# Patient Record
Sex: MALE | Race: WHITE | Employment: OTHER | URBAN - METROPOLITAN AREA
[De-identification: names, ages, dates, MRNs, and addresses within clinical notes are randomized per-mention and may not be internally consistent; named-entity substitution may affect disease eponyms.]

---

## 2017-01-25 ENCOUNTER — HOSPITAL ENCOUNTER (OUTPATIENT)
Dept: GENERAL RADIOLOGY | Age: 72
Discharge: HOME | End: 2017-01-25
Payer: COMMERCIAL

## 2017-01-25 DIAGNOSIS — M43.16 SPONDYLOLISTHESIS, LUMBAR REGION: ICD-10-CM

## 2017-01-25 PROCEDURE — 72100 X-RAY EXAM L-S SPINE 2/3 VWS: CPT

## 2017-04-25 ENCOUNTER — PATIENT OUTREACH (OUTPATIENT)
Dept: CASE MANAGEMENT | Age: 72
End: 2017-04-25

## 2017-04-25 ENCOUNTER — HOSPITAL ENCOUNTER (OUTPATIENT)
Dept: PHYSICAL THERAPY | Age: 72
Discharge: HOME | End: 2017-04-25
Payer: COMMERCIAL

## 2017-04-25 PROBLEM — R29.3 POSTURAL IMBALANCE: Status: ACTIVE | Noted: 2017-04-25

## 2017-04-25 PROBLEM — G20 PARKINSON DISEASE (HCC): Status: ACTIVE | Noted: 2017-04-25

## 2017-04-25 PROCEDURE — 92524 BEHAVRAL QUALIT ANALYS VOICE: CPT | Performed by: SPEECH-LANGUAGE PATHOLOGIST

## 2017-04-25 PROCEDURE — G9172 VOICE GOAL STATUS: HCPCS | Performed by: SPEECH-LANGUAGE PATHOLOGIST

## 2017-04-25 PROCEDURE — G9171 VOICE CURRENT STATUS: HCPCS | Performed by: SPEECH-LANGUAGE PATHOLOGIST

## 2017-04-25 NOTE — PROGRESS NOTES
Steve Mihaela  : 1945 [facility] [place] [address], [address], [facility] [address].  [phone]   [phone]       Patient is currently getting PT at [facility] for his low back but also for balance. [facility] Knox are doing it all since they know I have Parkinson's. \" ed to patient on PWR program and encouraged patient to finish current PT to improve balance/function but to eventually come to me at a later date (sooner rather than later) for ed on Parkinson's PWR HEP to prevent further balance/function deficits. Gave patient my card as well.  Patient agreeable

## 2017-04-25 NOTE — PROGRESS NOTES
SIMA NEELY saw patient and spouse in office following assessment in [facility] - patient is currently receiving o/p PT services through [facility] and will continue to do so until completed - he will be referred to ENT for LSVT clearance - patient was provided with community resource information on Parkinsons diease as well as contact information for this SW and also for ST in the future - was reminded of two week f/u phone call.

## 2017-05-01 ENCOUNTER — HOSPITAL ENCOUNTER (OUTPATIENT)
Dept: GENERAL RADIOLOGY | Age: 72
Discharge: HOME | End: 2017-05-01
Payer: COMMERCIAL

## 2017-05-01 DIAGNOSIS — M43.16 SPONDYLOLISTHESIS, LUMBAR REGION: ICD-10-CM

## 2017-05-01 PROCEDURE — 72100 X-RAY EXAM L-S SPINE 2/3 VWS: CPT

## 2017-08-14 NOTE — PROGRESS NOTES
Ran Galvan  : 1945 [facility] [place] [address] [facility], [address] [place] [address],[address], [facility] [address].  [phone]   [phone]         OUTPATIENT SPEECH LANGUAGE PATHOLOGY: Discontinuation Summary    ICD-10: Treatment Diagnosis: Dysphonia R49.0  REFERRING PHYSICIAN: Wil Sherman,*  MD Orders: Evaluate and Treat  Return Physician Appointment: [facility]  PAST MEDICAL HISTORY:   Mr. Eduar Griffith is a 70 y.o. male who  has a past medical history of Arthritis; Calculus of kidney; Chronic pain; Claustrophobia; Hypertension; Lumbar stenosis with neurogenic claudication (11/10/2016); Parkinson's disease ([facility] [address] and Spondylolisthesis at L5-S1 level (10/25/2016). He also  has a past surgical history that includes lithotripsy and neurological procedure unlisted. MEDICAL/REFERRING DIAGNOSIS: Parkinson's disease [G20]  DATE OF ONSET: aproximately around   PRIOR LEVEL OF FUNCTION: Retired. 22 1/2 years as a  and also worked about 22 years at [address] [place] [address] [employer]: [facility] however seasonal allergies  ASSESSMENT:    SLP called and asked patient if he wanted to participate in LSVT on 8/10/17. Patient returned phone call and stated that he did not wish to participate. No goals were assessed at this time. Patient will need new order if he chooses to participate in LSVT at a later time. ?????? ? ? This section established at most recent assessment??????????  PROBLEM LIST (Impairments causing functional limitations):  1. dysphonia  GOALS: (Goals have been discussed and agreed upon with patient.)  SHORT-TERM FUNCTIONAL GOALS: Time Frame: 3-6 months  1. The pt will increase vocal loudness to an average of 70 dB at the word level.   Not assessed  2. The pt will increased vocal loudness to an average of 70 dB at the sentence level.  Not assessed  3. The pt will increased vocal loudness to an average of 70 dB while reading. Not assesed  4.  The pt will increase vocal loudness to an average of 70 dB during spontaneous speech. Not assessed  DISCHARGE GOALS: Time Frame: 3-6 months    1. Increased communication evidenced by an average of 70 dB in spontaneous speech. Not assessed  REHABILITATION POTENTIAL FOR STATED GOALS: GoodP  SUBJECTIVE:  Cooperative  Present Symptoms: Dysphonia      Current Medications:   Current Outpatient Prescriptions on File Prior to Encounter   Medication Sig Dispense Refill    losartan (COZAAR) 50 mg tablet Take  by mouth daily.  carbidopa-levodopa CR (SINEMET CR)  mg per tablet 1 tab Qhs 90 Tab 3    docusate sodium (COLACE) 100 mg capsule Take 100 mg by mouth every morning. Indications: CONSTIPATION      VIT C/VIT E/LUTEIN/MIN/OMEGA-3 (OCUVITE PO) Take  by mouth every morning. Last dose 10/19/16      cyanocobalamin (VITAMIN B-12) 1,000 mcg sublingual tablet Take 1,000 mcg by mouth every morning. Last dose 10/19/16      selegeline (ELDEPRYL) 5 mg tablet Take 5 mg by mouth two (2) times a day.  fluticasone (FLONASE) 50 mcg/actuation nasal spray 2 Sprays by Both Nostrils route daily.  multivitamin (ONE A DAY) tablet Take 1 Tab by mouth every morning. Last dose 10/19/16      Omega-3 Fatty Acids 500 mg cpDR Take  by mouth every morning. Last dose 10/19/16      ascorbic acid, vitamin C, (VITAMIN C) 500 mg tablet Take  by mouth. No current facility-administered medications on file prior to encounter. Date Last Reviewed: 4/25/17  History of reflux:  NO      Reflux medication:N/A  Social History/Home Situation: Lives with wife. Work/Activity History: Retired     OBJECTIVE:  Objective Measure: Tool Used: National Outcomes Measurement System: Functional Communication Measures: VOICE  Score:  Initial: 5 Most Recent: X (Date: -- )   Interpretation of Tool: This FCM should not be used for individuals who have had a laryngectomy or tracheotomy, or for individuals with resonance disorders.   o Level 1:  The individual is unable to use voice to communicate. Alternative means for communicating are used all of the time. The individual cannot participate in vocational, avocational, and social activities requiring voice. o Level 2:  Voice is not functional for communication most of the time. Alternative means for communicating must be used most of the time. The individuals participation in vocational, avocational, and social activities is significantly limited all of the time. o Level 3:  Voice is functional for communication, but is consistently distracting and interferes with communication by drawing attention to itself. Participation in vocational, avocational, and social activities is limited most of the time. o Level 4:  Voice is functional for communication, but sometimes distracting. The individuals ability to participate in vocational, avocational, and social activities requiring voice is occasionally affected in low-vocal demand activities, but consistently affected in high-vocal demand activities. o Level 5:  Voice occasionally sounds normal with self-monitoring, but there is some situational variation. The individuals ability to participate in vocational, avocational, and social activities requiring voice is rarely affected in low-vocal demand activities, but is occasionally affected in high-vocal demand activities. o Level 6:  Voice sounds normal most of the time across all settings and situations. Self-monitoring is consistent when needed. The individuals ability to participate in vocational, avocational, and social activities requiring voice is not affected in low vocal demand activities, but is rarely affected in high-vocal demand activities. o Level 7: The individuals ability to successfully and independently participate in vocational, avocational, and social activities requiring high-or low-vocal demands is not limited by voice.  Self-monitoring is effectively used, but only occasionally needed. Score Level 7 Level 6 Level 5 Level 4 Level 3 Level 2 Level 1   Modifier CH CI CJ CK CL CM CN   ? Voice:     - CURRENT STATUS: CJ - 20%-39% impaired, limited or restricted    - GOAL STATUS:  CI - 1%-19% impaired, limited or restricted    - D/C STATUS:  ---------------To be determined---------------  not lali to assess    Oral Motor Structure/Speech:     Voice: Voice Evaluation  Vocal Onset: No impairment  Vocal Quality: Low volume  Vocal Intensity: Monotone  Vocal Pitch: Impaired flexibility, Restricted range  Resonance: No impairment  Breath Support: Clavicular breathing  Vocal Cord Dysfunction: No impairment  Vocal Cord Dysfunction: No  Overall Voice Impairment Severity: Mild-moderate  S/Z Ratio: 3/8=.37  Maximum Phonation Time (with sustained \"ah\"): · Baseline volume: 1. 9 seconds at 61 dB, 2. 11 seconds at 63 dB  · Loud volume:  9 seconds at 72 dB, 2.10 seconds at 73 dB  Conversational Speech: 10  01 11 69 47 31 57 65 55 61 60 62 59 52 57 54 57 66 74 66 66   Oral Reading of the Grandfather Passage: 62  66 62 64 64 56 68 65 63 51 60 61 66 62 66 61 67 60 66 67 60   GRBAS:   Grade (overall degree of hoarseness): 1  Rough (impression of the irregularity of the vocal fold vibrations): 1  Breathy (impression of air loss through the glottis):0  Aesthenic (weakness or lack of power in the voice): 1  Strained (perceptual impression of vocal hyperfunction): 0      TOTAL SCORE (Normal=0): 3  Glides: impaired in pitch and range  Scales: impaired in pitch and range  Vocally Abusive Behavior:   · Caffeinated beverages    Assessment/Reassessment only, no treatment provided today__________________________________________________________________________________________________  Treatment Assessment:  Voice evaluation. Progression/Medical Necessity:   Patient is expected to demonstrate progress in volume to improve functional communication. Compliance with Program/Exercises:  Will assess as treatment progresses. Reason for Continuation of Services/Other Comments:  · Patient continues to require skilled intervention due to Dysphonia. Recommendations/Intent for next treatment session: \"Treatment next visit will focus on LSVT tasks\". NATHALIE Cristobal

## 2017-08-22 ENCOUNTER — APPOINTMENT (RX ONLY)
Dept: URBAN - METROPOLITAN AREA CLINIC 24 | Facility: CLINIC | Age: 72
Setting detail: DERMATOLOGY
End: 2017-08-22

## 2017-08-22 DIAGNOSIS — L82.1 OTHER SEBORRHEIC KERATOSIS: ICD-10-CM

## 2017-08-22 DIAGNOSIS — L57.0 ACTINIC KERATOSIS: ICD-10-CM

## 2017-08-22 DIAGNOSIS — L57.8 OTHER SKIN CHANGES DUE TO CHRONIC EXPOSURE TO NONIONIZING RADIATION: ICD-10-CM

## 2017-08-22 PROBLEM — M12.9 ARTHROPATHY, UNSPECIFIED: Status: ACTIVE | Noted: 2017-08-22

## 2017-08-22 PROCEDURE — 99203 OFFICE O/P NEW LOW 30 MIN: CPT

## 2017-08-22 PROCEDURE — ? PATIENT SPECIFIC COUNSELING

## 2017-08-22 PROCEDURE — ? COUNSELING

## 2017-08-22 PROCEDURE — ? PRESCRIPTION

## 2017-08-22 RX ORDER — FLUOROURACIL 2 G/40G
CREAM TOPICAL BID
Qty: 1 | Refills: 0 | Status: ERX | COMMUNITY
Start: 2017-08-22

## 2017-08-22 RX ADMIN — FLUOROURACIL: 2 CREAM TOPICAL at 17:05

## 2017-08-22 ASSESSMENT — LOCATION DETAILED DESCRIPTION DERM
LOCATION DETAILED: SUPERIOR MID FOREHEAD
LOCATION DETAILED: INFERIOR THORACIC SPINE

## 2017-08-22 ASSESSMENT — LOCATION ZONE DERM
LOCATION ZONE: FACE
LOCATION ZONE: TRUNK

## 2017-08-22 ASSESSMENT — LOCATION SIMPLE DESCRIPTION DERM
LOCATION SIMPLE: UPPER BACK
LOCATION SIMPLE: SUPERIOR FOREHEAD

## 2017-09-05 PROBLEM — R49.8 HYPOPHONIA: Status: ACTIVE | Noted: 2017-09-05

## 2017-09-05 PROBLEM — R25.1 TREMOR: Status: ACTIVE | Noted: 2017-09-05

## 2017-11-01 ENCOUNTER — HOSPITAL ENCOUNTER (OUTPATIENT)
Dept: GENERAL RADIOLOGY | Age: 72
Discharge: HOME | End: 2017-11-01
Payer: COMMERCIAL

## 2017-11-01 DIAGNOSIS — M43.17 SPONDYLOLISTHESIS AT L5-S1 LEVEL: ICD-10-CM

## 2017-11-01 PROCEDURE — 72100 X-RAY EXAM L-S SPINE 2/3 VWS: CPT

## 2018-02-27 ENCOUNTER — APPOINTMENT (RX ONLY)
Dept: URBAN - METROPOLITAN AREA CLINIC 24 | Facility: CLINIC | Age: 73
Setting detail: DERMATOLOGY
End: 2018-02-27

## 2018-02-27 DIAGNOSIS — L82.0 INFLAMED SEBORRHEIC KERATOSIS: ICD-10-CM

## 2018-02-27 DIAGNOSIS — L57.0 ACTINIC KERATOSIS: ICD-10-CM

## 2018-02-27 DIAGNOSIS — L82.1 OTHER SEBORRHEIC KERATOSIS: ICD-10-CM

## 2018-02-27 PROBLEM — J30.1 ALLERGIC RHINITIS DUE TO POLLEN: Status: ACTIVE | Noted: 2018-02-27

## 2018-02-27 PROBLEM — H91.90 UNSPECIFIED HEARING LOSS, UNSPECIFIED EAR: Status: ACTIVE | Noted: 2018-02-27

## 2018-02-27 PROCEDURE — 99213 OFFICE O/P EST LOW 20 MIN: CPT | Mod: 25

## 2018-02-27 PROCEDURE — ? LIQUID NITROGEN

## 2018-02-27 PROCEDURE — ? OTHER

## 2018-02-27 PROCEDURE — ? COUNSELING

## 2018-02-27 PROCEDURE — 17110 DESTRUCTION B9 LES UP TO 14: CPT

## 2018-02-27 ASSESSMENT — LOCATION DETAILED DESCRIPTION DERM
LOCATION DETAILED: RIGHT CENTRAL ZYGOMA
LOCATION DETAILED: RIGHT MID-UPPER BACK
LOCATION DETAILED: LEFT INFERIOR UPPER BACK

## 2018-02-27 ASSESSMENT — LOCATION ZONE DERM
LOCATION ZONE: FACE
LOCATION ZONE: TRUNK

## 2018-02-27 ASSESSMENT — LOCATION SIMPLE DESCRIPTION DERM
LOCATION SIMPLE: RIGHT UPPER BACK
LOCATION SIMPLE: RIGHT ZYGOMA
LOCATION SIMPLE: LEFT UPPER BACK

## 2018-02-27 NOTE — HPI: MEDICATION (5-FLUOROURACIL)
How Severe Are The Lesions?: moderate
Is This A New Presentation, Or A Follow-Up?: Follow Up 5-Fluorouracil Therapy
How Many Weeks Of 5-Fu Have You Completed?: 3

## 2018-02-27 NOTE — PROCEDURE: OTHER
Note Text (......Xxx Chief Complaint.): This diagnosis correlates with the
Detail Level: Zone
Other (Free Text): Patient complete a full course of Efudex on forehead and face.He had a great outcome.

## 2018-02-27 NOTE — PROCEDURE: LIQUID NITROGEN
Medical Necessity Information: It is in your best interest to select a reason for this procedure from the list below. All of these items fulfill various CMS LCD requirements except the new and changing color options.
Add 52 Modifier (Optional): no
Post-Care Instructions: I reviewed with the patient in detail post-care instructions. Patient is to wear sunprotection, and avoid picking at any of the treated lesions. Pt may apply Vaseline to crusted or scabbing areas.
Consent: The patient's consent was obtained including but not limited to risks of crusting, scabbing, blistering, scarring, darker or lighter pigmentary change, recurrence, incomplete removal and infection.
Detail Level: Detailed
Medical Necessity Clause: This procedure was medically necessary because the lesions that were treated were:

## 2018-05-07 PROBLEM — G24.8 LIMB DYSTONIA: Status: ACTIVE | Noted: 2018-05-07

## 2018-08-20 PROBLEM — G24.9 DYSKINESIA DUE TO PARKINSON'S DISEASE (HCC): Status: ACTIVE | Noted: 2018-08-20

## 2018-08-20 PROBLEM — G20 DYSKINESIA DUE TO PARKINSON'S DISEASE (HCC): Status: ACTIVE | Noted: 2018-08-20

## 2018-08-20 PROBLEM — R26.89 MULTIFACTORIAL GAIT DISORDER: Status: ACTIVE | Noted: 2018-08-20

## 2019-02-22 NOTE — PROGRESS NOTES
Ran Galvan  : 1945 [facility]  [address], [address], [facility].  [phone]   [phone]         OUTPATIENT SPEECH LANGUAGE PATHOLOGY: Initial Assessment    ICD-10: Treatment Diagnosis: Dysphonia R49.0  REFERRING PHYSICIAN: Wil Sherman,*  MD Orders: Evaluate and Treat  Return Physician Appointment: [facility]  PAST MEDICAL HISTORY:   Mr. Eduar Griffith is a 70 y.o. male who  has a past medical history of Arthritis; Calculus of kidney; Chronic pain; Claustrophobia; Hypertension; Lumbar stenosis with neurogenic claudication (11/10/2016); Parkinson's disease ([facility] [address] and Spondylolisthesis at L5-S1 level (10/25/2016). He also  has a past surgical history that includes lithotripsy and neurological procedure unlisted. MEDICAL/REFERRING DIAGNOSIS: Parkinson's disease [G20]  DATE OF ONSET: aproximately around   PRIOR LEVEL OF FUNCTION: Retired. 22 1/2 years as a  and also worked about 22 years at [address] [place] [address] [employer]: [facility] however seasonal allergies  ASSESSMENT:  Patient is present this date at the [place] [address]. Patient was approximately diagnosed with PD around . He first noticed changes in his walking and speech. His speech was slurred and his wife thought that he had a stroke. She took him to the hospital and they ran tests and all came back normal. He was sent to Dr. Torrey Jarvis who diagnosed him with PD. He denies deficits in swallowing. However he reports changes in his voice. He states that he has to force himself to be loud. Denies reflux. Drinks about 3-4 8 ounces of water and drinks moderate amounts of coffee. Based on the objective data described below, Mr. Eduar Griffith presents with a mild-moderate voice disorder characterized by mild hoarseness and breathiness, mild-moderate vocal range and  decreased prosody and intonation.   In conversational speech, the pt's dB ranged from 52-64 with an average of 60. Volume increased to an average of 62 with oral reading of \"The Grandfather Passage\". When asked to produce sustained \"ah\", maximum duration was 9 seconds at 61 dB. When asked to produce a loud \"ah\" duration unchanged to 9 seconds with an increased in dB to 73. This patient is a candidate for LSVT, which is an intense voice therapy designed for patients with Parkinson's disease. Patient will need an ENT consult to assess vocal cord function prior to beginning therapy. At this time, patient will complete the ENT referral but wish to not begin treatment until Summer. He is currently getting PT and wish to finish that before he starts. He will attend therapy at [facility].   ?????? ? ? This section established at most recent assessment??????????  PROBLEM LIST (Impairments causing functional limitations):  1. dysphonia  GOALS: (Goals have been discussed and agreed upon with patient.)  SHORT-TERM FUNCTIONAL GOALS: Time Frame: 3-6 months  1. The pt will increase vocal loudness to an average of 70 dB at the word level.     2. The pt will increased vocal loudness to an average of 70 dB at the sentence level.    3. The pt will increased vocal loudness to an average of 70 dB while reading. 4. The pt will increase vocal loudness to an average of 70 dB during spontaneous speech. DISCHARGE GOALS: Time Frame: 3-6 months    1. Increased communication evidenced by an average of 70 dB in spontaneous speech. REHABILITATION POTENTIAL FOR STATED GOALS: GoodPLAN OF CARE:  Patient will benefit from skilled intervention to address the following impairments.   INTERVENTIONS PLANNED: (Benefits and precautions of therapy have been discussed with the patient.)  COMMUNICATION STRATEGIES: \"Loud\" speech  VOCAL HYGIENE RECOMMENDATIONS:  · Increase water intake  · Limit caffeinated beverages  TREATMENT PLAN EFFECTIVE DATES: 4/25/17 TO 7/25/17  FREQUENCY/DURATION: Continue to follow patient 4 times a week for 4 weeks to address above goals. Regarding Abrahan Kramer's therapy, I certify that the treatment plan above will be carried out by a therapist or under their direction. Thank you for this referral,  NATHALIE Webb      _______________________________________               _______________   Referring Physician Signature: Hallie Jannette Blanchards,*     Date      SUBJECTIVE:  Cooperative  Present Symptoms: Dysphonia      Current Medications:   Current Outpatient Prescriptions on File Prior to Encounter   Medication Sig Dispense Refill    losartan (COZAAR) 50 mg tablet Take  by mouth daily.  carbidopa-levodopa (SINEMET)  mg per tablet 1.5 tab QID (every 5 hrs) 540 Tab 3    carbidopa-levodopa CR (SINEMET CR)  mg per tablet 1 tab Qhs 90 Tab 3    VIT C/VIT E/LUTEIN/MIN/OMEGA-3 (OCUVITE PO) Take  by mouth.  docusate sodium (COLACE) 100 mg capsule Take 100 mg by mouth every morning. Indications: CONSTIPATION      VIT C/VIT E/LUTEIN/MIN/OMEGA-3 (OCUVITE PO) Take  by mouth every morning. Last dose 10/19/16      cyanocobalamin (VITAMIN B-12) 1,000 mcg sublingual tablet Take 1,000 mcg by mouth every morning. Last dose 10/19/16      selegeline (ELDEPRYL) 5 mg tablet Take 5 mg by mouth two (2) times a day.  fluticasone (FLONASE) 50 mcg/actuation nasal spray 2 Sprays by Both Nostrils route daily.  multivitamin (ONE A DAY) tablet Take 1 Tab by mouth every morning. Last dose 10/19/16      Omega-3 Fatty Acids 500 mg cpDR Take  by mouth every morning. Last dose 10/19/16      ascorbic acid, vitamin C, (VITAMIN C) 500 mg tablet Take  by mouth. No current facility-administered medications on file prior to encounter. Date Last Reviewed: 4/25/17  History of reflux:  NO      Reflux medication:N/A  Social History/Home Situation: Lives with wife. Work/Activity History: Retired     OBJECTIVE:  Objective Measure:   Tool Used: National Outcomes Measurement System: Functional Communication Measures: VOICE  Score:  Initial: 5 Most Recent: X (Date: -- )   Interpretation of Tool: This FCM should not be used for individuals who have had a laryngectomy or tracheotomy, or for individuals with resonance disorders. o Level 1:  The individual is unable to use voice to communicate. Alternative means for communicating are used all of the time. The individual cannot participate in vocational, avocational, and social activities requiring voice. o Level 2:  Voice is not functional for communication most of the time. Alternative means for communicating must be used most of the time. The individuals participation in vocational, avocational, and social activities is significantly limited all of the time. o Level 3:  Voice is functional for communication, but is consistently distracting and interferes with communication by drawing attention to itself. Participation in vocational, avocational, and social activities is limited most of the time. o Level 4:  Voice is functional for communication, but sometimes distracting. The individuals ability to participate in vocational, avocational, and social activities requiring voice is occasionally affected in low-vocal demand activities, but consistently affected in high-vocal demand activities. o Level 5:  Voice occasionally sounds normal with self-monitoring, but there is some situational variation. The individuals ability to participate in vocational, avocational, and social activities requiring voice is rarely affected in low-vocal demand activities, but is occasionally affected in high-vocal demand activities. o Level 6:  Voice sounds normal most of the time across all settings and situations. Self-monitoring is consistent when needed. The individuals ability to participate in vocational, avocational, and social activities requiring voice is not affected in low vocal demand activities, but is rarely affected in high-vocal demand activities. o Level 7:   The individuals ability to successfully and independently participate in vocational, avocational, and social activities requiring high-or low-vocal demands is not limited by voice. Self-monitoring is effectively used, but only occasionally needed. Score Level 7 Level 6 Level 5 Level 4 Level 3 Level 2 Level 1   Modifier CH CI CJ CK CL CM CN   ? Voice:     - CURRENT STATUS: CJ - 20%-39% impaired, limited or restricted    - GOAL STATUS:  CI - 1%-19% impaired, limited or restricted    - D/C STATUS:  ---------------To be determined---------------    Oral Motor Structure/Speech:     Voice: Voice Evaluation  Vocal Onset: No impairment  Vocal Quality: Low volume  Vocal Intensity: Monotone  Vocal Pitch: Impaired flexibility, Restricted range  Resonance: No impairment  Breath Support: Clavicular breathing  Vocal Cord Dysfunction: No impairment  Vocal Cord Dysfunction: No  Overall Voice Impairment Severity: Mild-moderate  S/Z Ratio: 3/8=.37  Maximum Phonation Time (with sustained \"ah\"):    · Baseline volume: 1. 9 seconds at 61 dB, 2. 11 seconds at 63 dB  · Loud volume:  9 seconds at 72 dB, 2.10 seconds at 73 dB  Conversational Speech: 01  47 97 44 24 51 42 65 55 61 60 62 59 52 57 54 57 66 74 66 66   Oral Reading of the Grandfather Passage: 62  66 62 64 64 56 68 65 63 51 60 61 66 62 66 61 67 60 66 67 60   GRBAS:   Grade (overall degree of hoarseness): 1  Rough (impression of the irregularity of the vocal fold vibrations): 1  Breathy (impression of air loss through the glottis):0  Aesthenic (weakness or lack of power in the voice): 1  Strained (perceptual impression of vocal hyperfunction): 0      TOTAL SCORE (Normal=0): 3  Glides: impaired in pitch and range  Scales: impaired in pitch and range  Vocally Abusive Behavior:   · Caffeinated beverages    Assessment/Reassessment only, no treatment provided today__________________________________________________________________________________________________  Treatment Assessment:  Voice evaluation. Progression/Medical Necessity:   Patient is expected to demonstrate progress in volume to improve functional communication. Compliance with Program/Exercises: Will assess as treatment progresses. Reason for Continuation of Services/Other Comments:  · Patient continues to require skilled intervention due to Dysphonia. Recommendations/Intent for next treatment session: \"Treatment next visit will focus on LSVT tasks\". Total Treatment Duration:  Time In: 1115  Time Out: 80[address] [facility], [facility]. Robby Manriquez Yes

## 2020-02-19 PROBLEM — K59.01 CONSTIPATION BY DELAYED COLONIC TRANSIT: Status: ACTIVE | Noted: 2020-02-19

## 2020-11-17 PROBLEM — K11.7 XEROSTOMIA: Status: ACTIVE | Noted: 2020-11-17

## 2021-10-22 PROBLEM — M25.672 ANKLE JOINT STIFFNESS, BILATERAL: Status: ACTIVE | Noted: 2021-10-22

## 2021-10-22 PROBLEM — G24.8 LIMB DYSTONIA: Status: RESOLVED | Noted: 2018-05-07 | Resolved: 2021-10-22

## 2021-10-22 PROBLEM — M25.671 ANKLE JOINT STIFFNESS, BILATERAL: Status: ACTIVE | Noted: 2021-10-22

## 2022-03-18 PROBLEM — R29.3 POSTURAL IMBALANCE: Status: ACTIVE | Noted: 2017-04-25

## 2022-03-19 PROBLEM — G20.A1 PARKINSON DISEASE: Status: ACTIVE | Noted: 2017-04-25

## 2022-03-19 PROBLEM — G20 PARKINSON DISEASE (HCC): Status: ACTIVE | Noted: 2017-04-25

## 2022-03-19 PROBLEM — R49.8 HYPOPHONIA: Status: ACTIVE | Noted: 2017-09-05

## 2022-03-19 PROBLEM — K11.7 XEROSTOMIA: Status: ACTIVE | Noted: 2020-11-17

## 2022-03-19 PROBLEM — M25.672 ANKLE JOINT STIFFNESS, BILATERAL: Status: ACTIVE | Noted: 2021-10-22

## 2022-03-19 PROBLEM — M25.671 ANKLE JOINT STIFFNESS, BILATERAL: Status: ACTIVE | Noted: 2021-10-22

## 2022-03-20 PROBLEM — G20 DYSKINESIA DUE TO PARKINSON'S DISEASE (HCC): Status: ACTIVE | Noted: 2018-08-20

## 2022-03-20 PROBLEM — G24.9 DYSKINESIA DUE TO PARKINSON'S DISEASE (HCC): Status: ACTIVE | Noted: 2018-08-20

## 2022-03-20 PROBLEM — K59.01 CONSTIPATION BY DELAYED COLONIC TRANSIT: Status: ACTIVE | Noted: 2020-02-19

## 2022-03-20 PROBLEM — R26.89 MULTIFACTORIAL GAIT DISORDER: Status: ACTIVE | Noted: 2018-08-20

## 2022-03-20 PROBLEM — R25.1 TREMOR: Status: ACTIVE | Noted: 2017-09-05

## 2022-03-20 PROBLEM — G20.B1 DYSKINESIA DUE TO PARKINSON'S DISEASE: Status: ACTIVE | Noted: 2018-08-20

## 2022-11-18 ENCOUNTER — TELEPHONE (OUTPATIENT)
Dept: NEUROLOGY | Age: 77
End: 2022-11-18

## 2022-11-18 NOTE — TELEPHONE ENCOUNTER
Patient received samples of an extended release amantadinelast year that was not covered. They are switching insurance plans and they are requesting the name of the medication. They were working with a  and they need the name of the medication to see if it is covered. When he tried the samples, it worked very well.

## 2023-01-12 ENCOUNTER — OFFICE VISIT (OUTPATIENT)
Dept: NEUROLOGY | Age: 78
End: 2023-01-12
Payer: MEDICARE

## 2023-01-12 VITALS
DIASTOLIC BLOOD PRESSURE: 90 MMHG | SYSTOLIC BLOOD PRESSURE: 140 MMHG | BODY MASS INDEX: 27.46 KG/M2 | HEART RATE: 90 BPM | WEIGHT: 165 LBS

## 2023-01-12 DIAGNOSIS — G47.52 RBD (REM BEHAVIORAL DISORDER): ICD-10-CM

## 2023-01-12 DIAGNOSIS — R25.1 TREMOR: ICD-10-CM

## 2023-01-12 DIAGNOSIS — Z79.899 ENCOUNTER FOR LONG-TERM (CURRENT) USE OF HIGH-RISK MEDICATION: ICD-10-CM

## 2023-01-12 DIAGNOSIS — G20 PARKINSON DISEASE (HCC): Primary | ICD-10-CM

## 2023-01-12 DIAGNOSIS — G24.9 DYSKINESIA DUE TO PARKINSON'S DISEASE (HCC): ICD-10-CM

## 2023-01-12 DIAGNOSIS — G20 DYSKINESIA DUE TO PARKINSON'S DISEASE (HCC): ICD-10-CM

## 2023-01-12 DIAGNOSIS — R13.19 DYSPHAGIA, NEUROLOGIC: ICD-10-CM

## 2023-01-12 DIAGNOSIS — R49.8 HYPOPHONIA: ICD-10-CM

## 2023-01-12 PROCEDURE — 1123F ACP DISCUSS/DSCN MKR DOCD: CPT | Performed by: PSYCHIATRY & NEUROLOGY

## 2023-01-12 PROCEDURE — 3077F SYST BP >= 140 MM HG: CPT | Performed by: PSYCHIATRY & NEUROLOGY

## 2023-01-12 PROCEDURE — 3080F DIAST BP >= 90 MM HG: CPT | Performed by: PSYCHIATRY & NEUROLOGY

## 2023-01-12 PROCEDURE — 99215 OFFICE O/P EST HI 40 MIN: CPT | Performed by: PSYCHIATRY & NEUROLOGY

## 2023-01-12 RX ORDER — CLONAZEPAM 0.5 MG/1
TABLET ORAL
Qty: 45 TABLET | Refills: 1 | Status: SHIPPED | OUTPATIENT
Start: 2023-01-12 | End: 2023-04-12

## 2023-01-12 RX ORDER — AMANTADINE 137 MG/1
CAPSULE, COATED PELLETS ORAL
Qty: 180 CAPSULE | Refills: 3
Start: 2023-01-12

## 2023-01-12 NOTE — PROGRESS NOTES
01/12/23  Alo Grigsby        Chief Complaint:  Chief Complaint   Patient presents with    Follow-up     Parkinson's disease       Parkinson's Disease Diagnosed: 2011 with tremor      HPI:   Alo Grigsby, 68 y. o.,male here in clinic follow up for continued management of Parkinson's Disease. He has been doing \"pretty well\" but voice is softer and hoarse. Wife states that no one can hear him on the phone. Swallowing is not an issue. He has thick saliva in the AM. Denies drooling. Sinemet is working well. Denies wearing off between doses. Dyskinesia controlled. Sleeping well. Has been having some dream enactment. Happening about 50% of the time. Wife states that when he was taking Gocovri for a month he was doing much better and even sleeping better. He did not want to do the paperwork for the financial assistance but can't afford $6400 a month so he went back to amantadine. He is still exercising daily. Current Neuro related meds:  Sinemet 25/100mg 2 tabs QID Q 4 hrs apart  Sinemet CR 50/200mg Qhs   Amantadine 100mg BID  Melatonin 5mg 2 caps Qhs  Selegiline 5mg BID      Review of Systems:    Review of Systems     Patient denies:  dizziness or light headedness,  drooling or swallowing issues  constipation,   hallucinations/ visual illusions or impulse control disorder   recent falls.    RBD     RLS    Past Medical History:   Diagnosis Date    Arthritis     spine    Calculus of kidney     Chronic pain     lumbar/ both legs    Claustrophobia     Hypertension     controlled with med    Lumbar stenosis with neurogenic claudication 11/10/2016    Parkinson's disease [facility])     dx age 59    Spondylolisthesis at L5-S1 level 10/25/2016        Past Surgical History:   Procedure Laterality Date    LITHOTRIPSY      age 25    NEUROLOGICAL SURGERY      back surgery       Family History   Problem Relation Age of Onset    No Known Problems Father     No Known Problems Sister     Hypertension Other     Dementia Mother Social History     Socioeconomic History    Marital status:      Spouse name: Not on file    Number of children: Not on file    Years of education: Not on file    Highest education level: Not on file   Occupational History    Not on file   Tobacco Use    Smoking status: Never    Smokeless tobacco: Never   Substance and Sexual Activity    Alcohol use: No    Drug use: No    Sexual activity: Not on file   Other Topics Concern    Not on file   Social History Narrative    Not on file     Social Determinants of Health     Financial Resource Strain: Not on file   Food Insecurity: Not on file   Transportation Needs: Not on file   Physical Activity: Not on file   Stress: Not on file   Social Connections: Not on file   Intimate Partner Violence: Not on file   Housing Stability: Not on file       Current Outpatient Medications on File Prior to Visit   Medication Sig Dispense Refill    Amantadine (SYMMETREL) 100 MG TABS tablet TAKE 1 TABLET BY MOUTH TWICE A DAY      ascorbic acid (VITAMIN C) 500 MG tablet Take by mouth      carbidopa-levodopa (SINEMET)  MG per tablet Take 2 tablets by mouth 4 times daily      carbidopa-levodopa (SINEMET CR)  MG per extended release tablet TAKE 1 TABLET BY MOUTH EVERYDAY AT BEDTIME      Cyanocobalamin 1000 MCG SUBL Take 1,000 mcg by mouth      docusate (COLACE, DULCOLAX) 100 MG CAPS Take 100 mg by mouth      fluticasone (FLONASE) 50 MCG/ACT nasal spray 2 sprays by Nasal route daily      losartan (COZAAR) 50 MG tablet Take by mouth daily      Melatonin 5 MG CAPS Take 10 mg by mouth      selegiline (ELDEPRYL) 5 MG tablet Take 5 mg by mouth 2 times daily       No current facility-administered medications on file prior to visit.        No Known Allergies        Physical Examination    Vitals:    01/12/23 1031 01/12/23 1034   BP: (!) 144/90 (!) 140/90   Position: Sitting Standing   Pulse: 90 90   Weight: 165 lb (74.8 kg)          General: No acute distress  Psychiatric: well oriented, normal mood and affect  Cardiovascular: no peripheral edema, no JVD, no carotid bruits, RRR  Pulmonary: CTAB  Skin: No rashes, lesions or ulcers  Ext: no C/C/E      Neurologic Exam  Patient oriented to Person, Place and Time. Language and fund of knowledge intact. CN:  Extraocular movements are intact,facial sensation  Intact and strength is intact, , palate elevates normally, tongue protrudes midline, shoulder shrug is normal.    Motor:RUE 5/5, RLE 5/5, LUE 5/5, LLE 5/5 ,  normal bulk and tone    Reflexes: Patellar reflexes are +2 , Achilles reflexes are 1+ , toes are downgoing. Sensation: Normal  light touch, temperature and vibration throughout     Cerebellar: FTN, HTS intact    Motor Examination: Last dose of sinemet was 3 hrs ago. Voice tremor       Chin tremor         RIGHT LEFT   Tremor at rest 0 0   Postural Tremor of hands 1 1   Action Tremor of hands 0 0   Tremor of Lower extremity 0 0   Open/Close 1 1   Rapid Alternating Movements of Hands 0 0   Finger Taps 0 1 R>L   Rigidity - Upper extremity 1 1   Rigidity- Lower extremity  1 1   Foot tapping/LE agility 1 1          Hypophonia 1   Hypomimia 1   Arising from Chair WNL   Posture WNL   Gait Decreased stride with decreased arm swing on left and right. Mild foot slapping gait on right foot    Pull test deferred   Dyskinesia  Mild but generalized and during exam only. Mouth included but not noticeable to patient   Body Bradykinsesia 1          Assessment/Plan:   Diagnosis Orders   1. Parkinson disease ([facility] [address]        2. Tremor        3. Encounter for long-term (current) use of high-risk medication        4. Hypophonia        5. Dyskinesia due to Parkinson's disease ([address]        6. Dysphagia, neurologic        7. RBD (REM behavioral disorder)            Tremor predominant PD H&Y stage 2 that is stable from a motor symptom perspective. He is having issues with hypophonia and hoarseness. I will refer to speech therapy with [facility].    RBD is increasing and he is on melatonin. I recommend clonazepam 0.25mg Qhs. His dyskinesia and movement is better with Gocovri so I will refer for financial assistance. And stop the amantadine. I have spent greater than 50% of the patient's 60 minute visit  in counseling for importance of exercise,  medications and education of disease and disease progression. Patient is to continue all other medications as directed by prescribing physicians unless addressed above in plan. Continuation of these medications from today's visit are made based on the patient's report of current medications.      Brennen Dutta MD  [facility] Neurology  Director Movement Disorders Program  [address] [place] [address] Kavin Kimble   [facility] [address] Concha Lara [address], [facility]  Phone: [phone]  Fax: [phone]

## 2023-01-14 DIAGNOSIS — G20 PARKINSON'S DISEASE (HCC): Primary | ICD-10-CM

## 2023-01-16 RX ORDER — SELEGILINE HYDROCHLORIDE 5 MG/1
TABLET ORAL
Qty: 180 TABLET | Refills: 3 | Status: SHIPPED | OUTPATIENT
Start: 2023-01-16

## 2023-01-25 ENCOUNTER — TELEPHONE (OUTPATIENT)
Dept: NEUROLOGY | Age: 78
End: 2023-01-25

## 2023-01-26 NOTE — TELEPHONE ENCOUNTER
I sent a referral request to [address] [place] [address] on 1/12/22 so they may not have had a chance to get back to them and schedule it. They may be backed up a bit. Can you check on the referral?  Also provide her with the number of the financial assistance for Gocovri. They should have already gotten our paperwork and will need to speak to her and ask some questions. Jean Jacobs can help them if there are issues but she has to be the one to first reach out or reply to them. In the mean time they will send her medications. You can ask Jean Jacobs how best to help you to help her.
Notified Pt and gave Pt the number for [facility] and also advised Pt that [address] may be behind a little bit with scheduling.
Pt's wife called in stating that she is needing the [facility] assistance. Pt's wife also stated that she had spoken with the Dr. Aung Leong regarding Speech Therapy and states that Pt cannot communicate well at all. Please Advise.
unknown

## 2023-02-02 DIAGNOSIS — G20 PARKINSON'S DISEASE (HCC): Primary | ICD-10-CM

## 2023-02-03 ENCOUNTER — TELEPHONE (OUTPATIENT)
Dept: NEUROLOGY | Age: 78
End: 2023-02-03

## 2023-04-18 ENCOUNTER — TELEPHONE (OUTPATIENT)
Dept: NEUROLOGY | Age: 78
End: 2023-04-18

## 2023-04-18 NOTE — TELEPHONE ENCOUNTER
Humberto from [address] [place] [address] left a voicemail in regards to a fax he sent over on 04/12/2023 referral order for [facility]  Phone number: [phone]  Fax number: [phone]    Had Dr. Dario Boyer to sign referral and faxed it to [address] [place] [address] at [phone]    Fax confirmation received

## 2023-07-19 DIAGNOSIS — G20 PARKINSON DISEASE (HCC): Primary | ICD-10-CM

## 2023-07-19 DIAGNOSIS — G20 PARKINSON DISEASE (HCC): ICD-10-CM

## 2023-07-19 LAB
BACTERIA URNS QL MICRO: NEGATIVE /HPF
BASOPHILS # BLD: 0 K/UL (ref 0–0.2)
BASOPHILS NFR BLD: 1 % (ref 0–2)
CASTS URNS QL MICRO: NORMAL /LPF (ref 0–2)
DIFFERENTIAL METHOD BLD: ABNORMAL
EOSINOPHIL # BLD: 0.1 K/UL (ref 0–0.8)
EOSINOPHIL NFR BLD: 2 % (ref 0.5–7.8)
EPI CELLS #/AREA URNS HPF: NORMAL /HPF (ref 0–5)
ERYTHROCYTE [DISTWIDTH] IN BLOOD BY AUTOMATED COUNT: 12.9 % (ref 11.9–14.6)
HCT VFR BLD AUTO: 41.1 % (ref 41.1–50.3)
HGB BLD-MCNC: 13.4 G/DL (ref 13.6–17.2)
IMM GRANULOCYTES # BLD AUTO: 0 K/UL (ref 0–0.5)
IMM GRANULOCYTES NFR BLD AUTO: 0 % (ref 0–5)
LYMPHOCYTES # BLD: 1.5 K/UL (ref 0.5–4.6)
LYMPHOCYTES NFR BLD: 26 % (ref 13–44)
MCH RBC QN AUTO: 31.9 PG (ref 26.1–32.9)
MCHC RBC AUTO-ENTMCNC: 32.6 G/DL (ref 31.4–35)
MCV RBC AUTO: 97.9 FL (ref 82–102)
MONOCYTES # BLD: 0.6 K/UL (ref 0.1–1.3)
MONOCYTES NFR BLD: 10 % (ref 4–12)
NEUTS SEG # BLD: 3.6 K/UL (ref 1.7–8.2)
NEUTS SEG NFR BLD: 61 % (ref 43–78)
NRBC # BLD: 0 K/UL (ref 0–0.2)
PLATELET # BLD AUTO: 176 K/UL (ref 150–450)
PMV BLD AUTO: 11.2 FL (ref 9.4–12.3)
RBC # BLD AUTO: 4.2 M/UL (ref 4.23–5.6)
RBC #/AREA URNS HPF: NORMAL /HPF (ref 0–5)
WBC # BLD AUTO: 5.8 K/UL (ref 4.3–11.1)
WBC URNS QL MICRO: NORMAL /HPF (ref 0–4)

## 2023-07-20 ENCOUNTER — CLINICAL DOCUMENTATION (OUTPATIENT)
Dept: NEUROLOGY | Age: 78
End: 2023-07-20

## 2023-07-20 NOTE — PROGRESS NOTES
Patient and wife came into the office stating the patient has been hallucinating and the inly change was [facility]. Informed normally he will see signs and symptoms earlier with taking the medication not months later. Informed Dr. Noah Leonard would like the patient to be checked for UTI and if it is not a UTI we will further evaluate the medication dosing.

## 2023-07-21 RX ORDER — QUETIAPINE FUMARATE 25 MG/1
TABLET, FILM COATED ORAL
Qty: 60 TABLET | Refills: 3 | Status: SHIPPED | OUTPATIENT
Start: 2023-07-21

## 2023-07-21 NOTE — TELEPHONE ENCOUNTER
Patient came into the office wanting to know what to do besides the blood work he just had. Informed blood work was normal and that per Dr. Nalini Ochoa she would like to reduce Gocovri to 1 capsule daily due to hallucinations. If hallucinations aren't any better she would like for the patient to start Seroquel at bedtime. Advised patient to call on Monday to let us know how things are going.

## 2023-08-08 RX ORDER — CARBIDOPA AND LEVODOPA 50; 200 MG/1; MG/1
TABLET, EXTENDED RELEASE ORAL
Qty: 90 TABLET | Refills: 3 | Status: SHIPPED | OUTPATIENT
Start: 2023-08-08

## 2023-10-13 NOTE — PROGRESS NOTES
Orders   1. Constipation by delayed colonic transit        2. Parkinson's disease with dyskinesia without fluctuating manifestations        3. Hypophonia        4. Tremor        5. Multifactorial gait disorder          Tremor predominant PD H&Y stage 2 that is stable from a motor symptom perspective. He is having issues with hypophonia and hoarseness. I will place a referral to speech therapy. I have spent greater than 50% of the patient's 60 minute visit  in counseling for importance of exercise,  medications and education of disease and disease progression. Patient is to continue all other medications as directed by prescribing physicians unless addressed above in plan. Continuation of these medications from today's visit are made based on the patient's report of current medications.      Eriberto Higgins MD  [facility] Neurology  Director Movement Disorders Program  [address] [place] [address] Jeremiah Olivares  [address] [place] [address] Dr. Madhuri Robbins [facility], [address] [place] [address] Drive  Phone: [phone]  Fax: [phone]

## 2023-10-17 ENCOUNTER — OFFICE VISIT (OUTPATIENT)
Dept: NEUROLOGY | Age: 78
End: 2023-10-17
Payer: MEDICARE

## 2023-10-17 VITALS
HEART RATE: 93 BPM | OXYGEN SATURATION: 95 % | WEIGHT: 155 LBS | SYSTOLIC BLOOD PRESSURE: 108 MMHG | BODY MASS INDEX: 25.79 KG/M2 | DIASTOLIC BLOOD PRESSURE: 73 MMHG

## 2023-10-17 DIAGNOSIS — K59.01 CONSTIPATION BY DELAYED COLONIC TRANSIT: Primary | ICD-10-CM

## 2023-10-17 DIAGNOSIS — G20.B1 PARKINSON'S DISEASE WITH DYSKINESIA WITHOUT FLUCTUATING MANIFESTATIONS: ICD-10-CM

## 2023-10-17 DIAGNOSIS — R49.8 HYPOPHONIA: ICD-10-CM

## 2023-10-17 DIAGNOSIS — R26.89 MULTIFACTORIAL GAIT DISORDER: ICD-10-CM

## 2023-10-17 DIAGNOSIS — R25.1 TREMOR: ICD-10-CM

## 2023-10-17 PROCEDURE — 3074F SYST BP LT 130 MM HG: CPT | Performed by: PSYCHIATRY & NEUROLOGY

## 2023-10-17 PROCEDURE — 1123F ACP DISCUSS/DSCN MKR DOCD: CPT | Performed by: PSYCHIATRY & NEUROLOGY

## 2023-10-17 PROCEDURE — 3078F DIAST BP <80 MM HG: CPT | Performed by: PSYCHIATRY & NEUROLOGY

## 2023-10-17 PROCEDURE — 99215 OFFICE O/P EST HI 40 MIN: CPT | Performed by: PSYCHIATRY & NEUROLOGY

## 2023-10-17 RX ORDER — OLMESARTAN MEDOXOMIL 20 MG/1
20 TABLET ORAL DAILY
COMMUNITY
Start: 2023-09-12

## 2023-10-17 ASSESSMENT — ENCOUNTER SYMPTOMS
CONSTIPATION: 1
BACK PAIN: 0

## 2023-10-30 ENCOUNTER — OFFICE VISIT (OUTPATIENT)
Age: 78
End: 2023-10-30
Payer: MEDICARE

## 2023-10-30 DIAGNOSIS — R49.0 DYSPHONIA: Primary | ICD-10-CM

## 2023-10-30 DIAGNOSIS — G20.A1 PARKINSON'S DISEASE WITHOUT DYSKINESIA OR FLUCTUATING MANIFESTATIONS: ICD-10-CM

## 2023-10-30 PROCEDURE — 92524 BEHAVRAL QUALIT ANALYS VOICE: CPT | Performed by: SPEECH-LANGUAGE PATHOLOGIST

## 2023-10-30 NOTE — PROGRESS NOTES
Roughness(roughness/irregularity, breaks; not smooth)  [] 1  [x] 2  [] 3  BREATHINESS(audible escape of air during speech   [] 1 mild   [x] 2 moderate   [] 3 severe   AESTHENIA (loss of strength & energy; weakness)  [] 1 mild   [x] 2 moderate   [] 3 severe   STRAINED (marked by excessive effort   [] 1 mild    [x] 2 moderate   [] 3 severe   TOTAL GRBAS SCORE (0=NORMAL) 8  Throat Pain    [] Yes    [x] No   Speech:   [x] Frequent Repetitions Swallowing Difficulty   []Yes [x] No   []Solids:    []Liquids:    Frequent Throat Clearing   [x]Yes [] No  Comments: post nasal drip    Cognitive Changes   []Yes [x] No  MOCA []Yes [x] No  Comments:    [] Rapid / Slow Speech       [] Mumbling   [x]Imprecise articulation  [x] Dysfluency  []Stuttering  [x]Reduced Breath Support for Speech                                          SUBJECTIVE ASSESSMENT:    [] Profound      [] Severe                                                          [x]  Moderate     [] Mild     []  WFL IMPROVED DURING STIMULABILITY TEST? Vocal Intensity  []1     [x]2     [] 3   []4   []5                            [x]Yes       [] No   Articulation  []1     [x]2     [] 3   []4   [] 5             [x] Yes      []  No   Prosody  []1     [x]2     [] 3   []4   [] 5            [x]Yes       []  No   Vocal Quality  Federico Yassine / Donnis Drain / Chirs Arms  []1     [x]2     [] 3   []4   [] 5            [x] Yes      []  No   Breath Support for Speech []1     [x]2     [] 3   []4    [] 5            [x]Yes       []No   Fluency & Rate of Speech  Rapid / Slow / Dysfluent []1     [x]2     [] 3   []4   [] 5            [x]Yes       []No   Comments:       OBJECTIVE ASSESSMENT:                    Ah:    6 seconds    66 db          Paragraph 66  db                    Conversation  66 db               Ah with INTENT:  5 seconds at 77 dB          RECOMMENDATIONS:   [x]SPEAK OUT!  Therapy: 12   sessions for 12 weeks                              Additional REFERRALS    []Modified barium

## 2023-11-06 ENCOUNTER — OFFICE VISIT (OUTPATIENT)
Age: 78
End: 2023-11-06
Payer: MEDICARE

## 2023-11-06 DIAGNOSIS — G20.B1 PARKINSON'S DISEASE WITH DYSKINESIA WITHOUT FLUCTUATING MANIFESTATIONS: ICD-10-CM

## 2023-11-06 DIAGNOSIS — R49.0 DYSPHONIA: Primary | ICD-10-CM

## 2023-11-06 PROCEDURE — 92507 TX SP LANG VOICE COMM INDIV: CPT | Performed by: SPEECH-LANGUAGE PATHOLOGIST

## 2023-11-08 ENCOUNTER — OFFICE VISIT (OUTPATIENT)
Age: 78
End: 2023-11-08
Payer: MEDICARE

## 2023-11-08 DIAGNOSIS — R49.0 DYSPHONIA: Primary | ICD-10-CM

## 2023-11-08 DIAGNOSIS — G20.B1 PARKINSON'S DISEASE WITH DYSKINESIA WITHOUT FLUCTUATING MANIFESTATIONS: ICD-10-CM

## 2023-11-08 PROCEDURE — 92507 TX SP LANG VOICE COMM INDIV: CPT | Performed by: SPEECH-LANGUAGE PATHOLOGIST

## 2023-11-13 ENCOUNTER — OFFICE VISIT (OUTPATIENT)
Age: 78
End: 2023-11-13
Payer: MEDICARE

## 2023-11-13 DIAGNOSIS — G20.B1 PARKINSON'S DISEASE WITH DYSKINESIA WITHOUT FLUCTUATING MANIFESTATIONS: ICD-10-CM

## 2023-11-13 DIAGNOSIS — R49.0 DYSPHONIA: Primary | ICD-10-CM

## 2023-11-13 PROCEDURE — 92507 TX SP LANG VOICE COMM INDIV: CPT | Performed by: SPEECH-LANGUAGE PATHOLOGIST

## 2023-11-15 ENCOUNTER — OFFICE VISIT (OUTPATIENT)
Age: 78
End: 2023-11-15
Payer: MEDICARE

## 2023-11-15 DIAGNOSIS — R49.0 DYSPHONIA: Primary | ICD-10-CM

## 2023-11-15 DIAGNOSIS — G20.B1 PARKINSON'S DISEASE WITH DYSKINESIA WITHOUT FLUCTUATING MANIFESTATIONS: ICD-10-CM

## 2023-11-15 PROCEDURE — 92507 TX SP LANG VOICE COMM INDIV: CPT | Performed by: SPEECH-LANGUAGE PATHOLOGIST

## 2023-11-15 NOTE — PROGRESS NOTES
[] min  [] mod [] max 10/10                  Complete simple / complex cognitive-linguistic tasks  (workbook) at 72-78 dB. 74 74 72 73 76 76 74 76 73 72 [x] 0      [] min  [] mod [] max 10/10                  Complete simple / complex cognitive-linguistic tasks  (tx calendar) at 72-78 dB. 72 75 78 78 79 72 78 78 75 76 [x] 0      [] min  [] mod [] max 10/10     Assessment: [x] Progressing toward goals. [] No change. [] Other:  [x] Patient would continue to benefit from skilled speech pathology services to improve speaking with INTENT  Home Exercise Program:  [x] complete    []  incomplete    SHORT TERM GOALS:   Use INTENT to coordinate respiratory/phonatory/articulatory subsystems in hierarchical speech tasks with min  cues. Demonstrate use of intent at phrase, sentence, paragraph, and/or conversational levels with min  cues. Establish a daily home practice routine independently/with appropriate support. LONG TERM GOAL:  Use intentional speech to maximize functional communication in all settings    PLAN:     [x] Continue current frequency toward long and short term goals. Jeramy Corral

## 2023-11-29 ENCOUNTER — OFFICE VISIT (OUTPATIENT)
Age: 78
End: 2023-11-29
Payer: MEDICARE

## 2023-11-29 DIAGNOSIS — R49.0 DYSPHONIA: Primary | ICD-10-CM

## 2023-11-29 PROCEDURE — 92507 TX SP LANG VOICE COMM INDIV: CPT | Performed by: SPEECH-LANGUAGE PATHOLOGIST

## 2023-12-04 ENCOUNTER — OFFICE VISIT (OUTPATIENT)
Age: 78
End: 2023-12-04
Payer: MEDICARE

## 2023-12-04 DIAGNOSIS — G20.B1 PARKINSON'S DISEASE WITH DYSKINESIA WITHOUT FLUCTUATING MANIFESTATIONS: Primary | ICD-10-CM

## 2023-12-04 DIAGNOSIS — R49.0 DYSPHONIA: ICD-10-CM

## 2023-12-04 PROCEDURE — 92507 TX SP LANG VOICE COMM INDIV: CPT | Performed by: SPEECH-LANGUAGE PATHOLOGIST

## 2023-12-06 ENCOUNTER — OFFICE VISIT (OUTPATIENT)
Age: 78
End: 2023-12-06
Payer: MEDICARE

## 2023-12-06 DIAGNOSIS — G20.B1 PARKINSON'S DISEASE WITH DYSKINESIA WITHOUT FLUCTUATING MANIFESTATIONS: Primary | ICD-10-CM

## 2023-12-06 DIAGNOSIS — R49.0 DYSPHONIA: ICD-10-CM

## 2023-12-06 PROCEDURE — 92507 TX SP LANG VOICE COMM INDIV: CPT | Performed by: SPEECH-LANGUAGE PATHOLOGIST

## 2023-12-08 RX ORDER — AMANTADINE 137 MG/1
CAPSULE, COATED PELLETS ORAL
Qty: 60 CAPSULE | Refills: 0 | OUTPATIENT
Start: 2023-12-08

## 2023-12-11 ENCOUNTER — OFFICE VISIT (OUTPATIENT)
Age: 78
End: 2023-12-11
Payer: MEDICARE

## 2023-12-11 ENCOUNTER — TELEPHONE (OUTPATIENT)
Dept: NEUROLOGY | Age: 78
End: 2023-12-11

## 2023-12-11 DIAGNOSIS — G20.B1 PARKINSON'S DISEASE WITH DYSKINESIA WITHOUT FLUCTUATING MANIFESTATIONS: Primary | ICD-10-CM

## 2023-12-11 DIAGNOSIS — R49.0 DYSPHONIA: ICD-10-CM

## 2023-12-11 PROCEDURE — 92507 TX SP LANG VOICE COMM INDIV: CPT | Performed by: SPEECH-LANGUAGE PATHOLOGIST

## 2023-12-11 RX ORDER — AMANTADINE 137 MG/1
CAPSULE, COATED PELLETS ORAL
Qty: 180 CAPSULE | Refills: 3 | Status: ACTIVE | OUTPATIENT
Start: 2023-12-11

## 2023-12-13 ENCOUNTER — OFFICE VISIT (OUTPATIENT)
Age: 78
End: 2023-12-13

## 2023-12-13 DIAGNOSIS — G20.B1 PARKINSON'S DISEASE WITH DYSKINESIA WITHOUT FLUCTUATING MANIFESTATIONS: Primary | ICD-10-CM

## 2023-12-13 DIAGNOSIS — R49.0 DYSPHONIA: ICD-10-CM

## 2024-02-09 DIAGNOSIS — G20.A1 PARKINSON'S DISEASE: ICD-10-CM

## 2024-02-09 RX ORDER — SELEGILINE HYDROCHLORIDE 5 MG/1
5 TABLET ORAL 2 TIMES DAILY
Qty: 180 TABLET | Refills: 3 | Status: SHIPPED | OUTPATIENT
Start: 2024-02-09

## 2024-02-09 NOTE — TELEPHONE ENCOUNTER
RX REFILL REQUEST      Abdullahi Milton  1945    **Medication Name: sinemet    **Medication Dose: 25/100mg    **Frequency: 2 tabs QID    **[facility]

## 2024-04-11 ENCOUNTER — TELEPHONE (OUTPATIENT)
Dept: NEUROLOGY | Age: 79
End: 2024-04-11

## 2024-04-16 ENCOUNTER — OFFICE VISIT (OUTPATIENT)
Dept: NEUROLOGY | Age: 79
End: 2024-04-16
Payer: MEDICARE

## 2024-04-16 VITALS
DIASTOLIC BLOOD PRESSURE: 75 MMHG | BODY MASS INDEX: 25.13 KG/M2 | OXYGEN SATURATION: 96 % | HEART RATE: 88 BPM | WEIGHT: 151 LBS | SYSTOLIC BLOOD PRESSURE: 121 MMHG

## 2024-04-16 DIAGNOSIS — G20.B2 PARKINSON'S DISEASE WITH DYSKINESIA AND FLUCTUATING MANIFESTATIONS (HCC): Primary | ICD-10-CM

## 2024-04-16 DIAGNOSIS — K59.01 CONSTIPATION BY DELAYED COLONIC TRANSIT: ICD-10-CM

## 2024-04-16 DIAGNOSIS — R49.8 HYPOPHONIA: ICD-10-CM

## 2024-04-16 DIAGNOSIS — R29.3 POSTURAL IMBALANCE: ICD-10-CM

## 2024-04-16 DIAGNOSIS — R26.89 MULTIFACTORIAL GAIT DISORDER: ICD-10-CM

## 2024-04-16 DIAGNOSIS — G47.52 RBD (REM BEHAVIORAL DISORDER): ICD-10-CM

## 2024-04-16 PROBLEM — G20.B1 DYSKINESIA DUE TO PARKINSON'S DISEASE (HCC): Status: RESOLVED | Noted: 2018-08-20 | Resolved: 2024-04-16

## 2024-04-16 PROCEDURE — 99215 OFFICE O/P EST HI 40 MIN: CPT | Performed by: PSYCHIATRY & NEUROLOGY

## 2024-04-16 PROCEDURE — G2211 COMPLEX E/M VISIT ADD ON: HCPCS | Performed by: PSYCHIATRY & NEUROLOGY

## 2024-04-16 PROCEDURE — 3078F DIAST BP <80 MM HG: CPT | Performed by: PSYCHIATRY & NEUROLOGY

## 2024-04-16 PROCEDURE — 3074F SYST BP LT 130 MM HG: CPT | Performed by: PSYCHIATRY & NEUROLOGY

## 2024-04-16 PROCEDURE — 1123F ACP DISCUSS/DSCN MKR DOCD: CPT | Performed by: PSYCHIATRY & NEUROLOGY

## 2024-04-16 RX ORDER — CLONAZEPAM 0.5 MG/1
TABLET ORAL
Qty: 45 TABLET | Refills: 1 | Status: SHIPPED | OUTPATIENT
Start: 2024-04-16 | End: 2024-07-17

## 2024-04-16 RX ORDER — ACETAMINOPHEN 160 MG
2000 TABLET,DISINTEGRATING ORAL DAILY
COMMUNITY

## 2024-04-16 RX ORDER — QUETIAPINE FUMARATE 25 MG/1
TABLET, FILM COATED ORAL
Qty: 60 TABLET | Refills: 3 | Status: SHIPPED | OUTPATIENT
Start: 2024-04-16

## 2024-04-16 RX ORDER — CARBIDOPA AND LEVODOPA 50; 200 MG/1; MG/1
1 TABLET, EXTENDED RELEASE ORAL
Qty: 90 TABLET | Refills: 3 | Status: SHIPPED | OUTPATIENT
Start: 2024-04-16

## 2024-04-16 ASSESSMENT — ENCOUNTER SYMPTOMS
CONSTIPATION: 1
BACK PAIN: 0

## 2024-04-16 NOTE — PROGRESS NOTES
04/16/24  Abdullahi Milton        Chief Complaint:  Chief Complaint   Patient presents with    Follow-up       Parkinson Disease         Parkinson's Disease Diagnosed: 2011 with tremors       HPI:   Abdullahi Milton, 78 y.o.,male here in clinic follow up for continued management of Parkinson's Disease with dyskinesia. Sinemet is working well for him. Denies wearing off before the next dose.   Sleeping better. Denies RBD and wife agrees. Swallowing is not an issue. Voice is something he is working on still after speech therapy.   Constipation is an issue but can improve with diet and prune juice.   Using a walker for longer distance. Balance is something he has to work on.     Current Neuro related meds:  Sinemet 25/100mg 2 tabs QID Q 4 hrs apart  Seroquel 25 mg 1/2 tab Qhs  Sinemet CR 50/200mg Qhs   Gocovri 137mg 2 caps Qhs  Melatonin 5mg 2 caps Qhs  Selegiline 5mg BID             Review of Systems   Constitutional:  Negative for appetite change.   HENT:  Negative for hearing loss and tinnitus.    Eyes:  Negative for visual disturbance.   Gastrointestinal:  Positive for constipation.   Musculoskeletal:  Negative for back pain and neck pain.   Neurological:  Positive for speech difficulty and numbness. Negative for dizziness, tremors, seizures and headaches.   Psychiatric/Behavioral:  Negative for hallucinations and sleep disturbance. The patient is not nervous/anxious.           Past Medical History:   Diagnosis Date    Arthritis     spine    Calculus of kidney     Chronic pain     lumbar/ both legs    Claustrophobia     Hypertension     controlled with med    Lumbar stenosis with neurogenic claudication 11/10/2016    Parkinson's disease (HCC)     dx age 64    Spondylolisthesis at L5-S1 level 10/25/2016        Past Surgical History:   Procedure Laterality Date    LITHOTRIPSY      age 24    NEUROLOGICAL SURGERY      back surgery       Family History   Problem Relation Age of Onset    No Known Problems

## 2024-04-17 ENCOUNTER — TELEPHONE (OUTPATIENT)
Dept: NEUROLOGY | Age: 79
End: 2024-04-17

## 2024-04-17 NOTE — TELEPHONE ENCOUNTER
Patients wife called about [facility] medication. She said they had heard a few things and were unsure what source to trust in terms of getting medication. I called patient back and LVM saying that they should've gotten a letter from [facility] and should be getting a phone call to set up delivery of medication if they were approved for funding/medication through the assistance forms we sent in. Let them know if they have any further questions to please give us a call back.

## 2024-06-19 ENCOUNTER — TELEPHONE (OUTPATIENT)
Dept: NEUROLOGY | Age: 79
End: 2024-06-19

## 2024-06-19 NOTE — TELEPHONE ENCOUNTER
Patient walked in and asked to speak to you but you were on your lunch break. They need to talk to you about the issues they are having getting patient assistance for Gocovri 137mg. Patient asked if you can give them a call when you get a chance. Thank you

## 2024-06-21 NOTE — TELEPHONE ENCOUNTER
Called and LVM explaining that they do need to continue to call the foundation numbers and assistance numbers that we sent in a [employer] message back in April. Those foundations constantly call people off of a wait list to get assistance. Even though they may take some time to do that it is important that they continue to call them and get on those wait lists. I let them know if they have any further questions to give us a call back.

## 2024-07-02 ENCOUNTER — TELEPHONE (OUTPATIENT)
Dept: NEUROLOGY | Age: 79
End: 2024-07-02

## 2024-07-02 NOTE — TELEPHONE ENCOUNTER
Spoke with Dr. Yang in person about patient. Communicated with patients wife and asked that they contact PCP to check for a UTI. Also advised that they can take a full tab of Seroquel at bedtime to hopefully help with the hallucinations.

## 2024-07-02 NOTE — TELEPHONE ENCOUNTER
Patients wife called and said since Sunday he has been having hallucinations and has just not been himself. She did mention that he accidentally skipped a dose of medication in the day on Sunday so she is unsure if that would cause this, but she is very worried and wants to know what they can do for his hallucinations. Stated she is not leaving him alone now because she is afraid and hasn't been letting him drive and also mentioned she can't drive at the moment because she recently had a trip to the hospital.

## 2024-07-05 NOTE — TELEPHONE ENCOUNTER
Patients wife called and said after increase in meds there has been no change he is still having hallucinations all day, has been extremely restless, is having vision changes, and not walking well (has james stumbling a luigi). Wife stated he was not having any of these issues at all until about a week ago. He has no UTI, is taking medication on time, and drinking a lot of water. They are unsure what to do at this point.

## 2024-07-05 NOTE — TELEPHONE ENCOUNTER
Seroquel will be increased to 2 tabs Qhs and can add 1/2 tab in the AM if needed. We can continue to increase if needed. Urinalysis was negative.

## 2024-07-08 ENCOUNTER — TELEPHONE (OUTPATIENT)
Dept: NEUROLOGY | Age: 79
End: 2024-07-08

## 2024-07-08 NOTE — TELEPHONE ENCOUNTER
Patients wife says that his hallucinations are not better. He has been on 2 tabs of the Seroquel. She says when he takes the 2 tabs he is very restless in his sleep and is constantly moving. He has an appt with his pcp on Wednesday so she will ask them to check for any type of infection.

## 2024-07-16 ENCOUNTER — OFFICE VISIT (OUTPATIENT)
Dept: NEUROLOGY | Age: 79
End: 2024-07-16
Payer: MEDICARE

## 2024-07-16 VITALS
WEIGHT: 145 LBS | SYSTOLIC BLOOD PRESSURE: 108 MMHG | HEART RATE: 88 BPM | BODY MASS INDEX: 24.13 KG/M2 | OXYGEN SATURATION: 95 % | DIASTOLIC BLOOD PRESSURE: 72 MMHG

## 2024-07-16 DIAGNOSIS — F06.8 PSYCHOSIS DUE TO PARKINSON'S DISEASE (HCC): Primary | ICD-10-CM

## 2024-07-16 DIAGNOSIS — G20.A1 PSYCHOSIS DUE TO PARKINSON'S DISEASE (HCC): Primary | ICD-10-CM

## 2024-07-16 DIAGNOSIS — Z79.899 ENCOUNTER FOR LONG-TERM (CURRENT) USE OF HIGH-RISK MEDICATION: ICD-10-CM

## 2024-07-16 DIAGNOSIS — G20.B1 PARKINSON'S DISEASE WITH DYSKINESIA WITHOUT FLUCTUATING MANIFESTATIONS (HCC): ICD-10-CM

## 2024-07-16 DIAGNOSIS — G47.52 RBD (REM BEHAVIORAL DISORDER): ICD-10-CM

## 2024-07-16 DIAGNOSIS — R25.1 TREMOR: ICD-10-CM

## 2024-07-16 PROCEDURE — 3078F DIAST BP <80 MM HG: CPT | Performed by: PSYCHIATRY & NEUROLOGY

## 2024-07-16 PROCEDURE — 99215 OFFICE O/P EST HI 40 MIN: CPT | Performed by: PSYCHIATRY & NEUROLOGY

## 2024-07-16 PROCEDURE — 1123F ACP DISCUSS/DSCN MKR DOCD: CPT | Performed by: PSYCHIATRY & NEUROLOGY

## 2024-07-16 PROCEDURE — G2211 COMPLEX E/M VISIT ADD ON: HCPCS | Performed by: PSYCHIATRY & NEUROLOGY

## 2024-07-16 PROCEDURE — 3074F SYST BP LT 130 MM HG: CPT | Performed by: PSYCHIATRY & NEUROLOGY

## 2024-07-16 RX ORDER — AMANTADINE 137 MG/1
CAPSULE, COATED PELLETS ORAL
Qty: 90 CAPSULE | Refills: 3 | Status: ACTIVE | OUTPATIENT
Start: 2024-07-16

## 2024-07-16 RX ORDER — QUETIAPINE FUMARATE 25 MG/1
TABLET, FILM COATED ORAL
Qty: 270 TABLET | Refills: 3 | Status: SHIPPED | OUTPATIENT
Start: 2024-07-16

## 2024-07-16 RX ORDER — CLONAZEPAM 0.5 MG/1
TABLET ORAL
Qty: 45 TABLET | Refills: 1 | Status: SHIPPED | OUTPATIENT
Start: 2024-07-16 | End: 2024-10-16

## 2024-07-16 ASSESSMENT — ENCOUNTER SYMPTOMS
BACK PAIN: 0
CONSTIPATION: 1

## 2024-07-29 ENCOUNTER — OFFICE VISIT (OUTPATIENT)
Dept: NEUROLOGY | Age: 79
End: 2024-07-29

## 2024-07-29 DIAGNOSIS — R41.89 COGNITIVE AND BEHAVIORAL CHANGES: Primary | ICD-10-CM

## 2024-07-29 DIAGNOSIS — R46.89 COGNITIVE AND BEHAVIORAL CHANGES: Primary | ICD-10-CM

## 2024-07-29 NOTE — PROGRESS NOTES
07/29/24  Abdullahi Milton      Cognitive Assessment  Reason for testing: Baseline Eval    SUBJECTIVE:   This 78 year old male was administered a battery of neurocognitive testing on 07/29/2024.      OBJECTIVE:  Tests Administered:  Trails A, Trails B, Digit Symbol Substitution, Stroop, Flanker, Immediate Recognition, Delayed  Recognition, Coordination Test    The combined test administration time was 30 minutes    Test Results:  Cognitive testing was provided via a battery of cognitive assessments. The pattern of test scores  indicate that results are valid.A Clinical Report with further description of scores and results is also available.    Overall: Patient tested in the 5th percentile (standard score of 75).    Attention:  Trails A: Patient tested in the 35th percentile (scaled standard score of 94).    Mental Flexibility:  Trails B: These assessments were not scored because they were potentially invalid, or the patient failed to complete in the allotted time.    Executive Function:  Digit Symbol Substitution: Patient tested in the1st percentile (scaled standard score of 62).  Stroop: Patient tested in the 17th percentile (scaled standard score of 86).    Memory:  Immediate Recognition: Patient tested in the 6th percentile (scaled standard score of 76).  Delayed Recognition: Patient tested in the 12th percentile (scaled standard score of 82).         Interpretation of Test Scores:    Patient scores 75 in cognitive functioning. Hisr complaints are corroborated with standardized testing.     Clinical Decision Making and Plan:   No changes to current medications indicated at this point.     Feedback to Patient:   Will be discussed with patient at next scheduled visit.         31 minutes was spent reviewing and interpreting the cognitive testing results, discussing  the results with the patient and family, and integrating the results into the assessment  plan.  18 minutes was spent administering and scoring

## 2024-08-19 ENCOUNTER — OFFICE VISIT (OUTPATIENT)
Dept: NEUROLOGY | Age: 79
End: 2024-08-19
Payer: MEDICARE

## 2024-08-19 VITALS — HEART RATE: 91 BPM | DIASTOLIC BLOOD PRESSURE: 71 MMHG | OXYGEN SATURATION: 94 % | SYSTOLIC BLOOD PRESSURE: 111 MMHG

## 2024-08-19 DIAGNOSIS — G31.84 MCI (MILD COGNITIVE IMPAIRMENT): Primary | ICD-10-CM

## 2024-08-19 PROCEDURE — 99483 ASSMT & CARE PLN PT COG IMP: CPT | Performed by: PSYCHIATRY & NEUROLOGY

## 2024-08-19 RX ORDER — RIVASTIGMINE TARTRATE 1.5 MG/1
1.5 CAPSULE ORAL 2 TIMES DAILY
Qty: 180 CAPSULE | Refills: 3 | Status: SHIPPED | OUTPATIENT
Start: 2024-08-19

## 2024-08-19 NOTE — PROGRESS NOTES
60 minutes were spent going over the cognitive care plan in detail with the patient and caregiver. Please see care plan attached as a word document in chart for details. We created and edited the care plan together.   I also started him on exelon 1.5mg BID.                          Dorothea Yang MD  [facility] Neurology  Director Movement Disorders Program  [place]ie [place] [address] [place] [place] [address]  Phone: [phone]  Fax: [phone]

## 2024-11-22 ENCOUNTER — TELEPHONE (OUTPATIENT)
Dept: NEUROLOGY | Age: 79
End: 2024-11-22

## 2024-11-22 NOTE — TELEPHONE ENCOUNTER
Patients wife called and just wanted to inform you that Mr. Milton had a very bad fall a while back and broke his femur and hip. He is in rehab, they had a mix up with his medication for a while, but they have since then gotten it figured out. She mentioned that's why they had to cancel and reschedule their appt with you.     She also mentioned info on gocovri and mentioned the [facility], but was unclear if they have been getting it from there or can't get it. Called back and LVM for her to please clarify what they needed regarding gocovri.

## 2024-11-27 NOTE — TELEPHONE ENCOUNTER
[employer] and assistance info in a [email or web address] message and have left a few voicemail's to let them know we can put a sample of gocovri upfront.

## 2024-12-29 DIAGNOSIS — G20.A1 PARKINSON'S DISEASE (HCC): ICD-10-CM

## 2024-12-30 RX ORDER — CARBIDOPA AND LEVODOPA 25; 100 MG/1; MG/1
TABLET ORAL
Qty: 720 TABLET | Refills: 3 | OUTPATIENT
Start: 2024-12-30

## 2025-02-13 DIAGNOSIS — G47.52 RBD (REM BEHAVIORAL DISORDER): ICD-10-CM

## 2025-02-13 DIAGNOSIS — G20.A1 PARKINSON'S DISEASE: ICD-10-CM

## 2025-02-13 RX ORDER — CLONAZEPAM 0.5 MG/1
TABLET ORAL
Qty: 45 TABLET | OUTPATIENT
Start: 2025-02-13

## 2025-02-13 RX ORDER — SELEGILINE HYDROCHLORIDE 5 MG/1
5 TABLET ORAL 2 TIMES DAILY
Qty: 180 TABLET | Refills: 3 | OUTPATIENT
Start: 2025-02-13

## 2025-02-13 RX ORDER — CARBIDOPA AND LEVODOPA 25; 100 MG/1; MG/1
TABLET ORAL
Qty: 720 TABLET | Refills: 3 | OUTPATIENT
Start: 2025-02-13

## 2025-02-14 DIAGNOSIS — G47.52 RBD (REM BEHAVIORAL DISORDER): ICD-10-CM

## 2025-02-14 RX ORDER — CLONAZEPAM 0.5 MG/1
TABLET ORAL
Qty: 45 TABLET | Refills: 1 | Status: SHIPPED | OUTPATIENT
Start: 2025-02-14 | End: 2025-05-17

## 2025-02-14 NOTE — TELEPHONE ENCOUNTER
RX REFILL REQUEST      Abdullahi Milton  1945    **Medication Name: clonazepam    **Medication Dose: 0.5mg    **Frequency: 1/2 tab Qhs    **[facility]

## 2025-02-20 DIAGNOSIS — G20.A1 PARKINSON'S DISEASE (HCC): ICD-10-CM

## 2025-02-20 RX ORDER — SELEGILINE HYDROCHLORIDE 5 MG/1
5 TABLET ORAL 2 TIMES DAILY
Qty: 180 TABLET | Refills: 3 | OUTPATIENT
Start: 2025-02-20

## 2025-02-22 DIAGNOSIS — G20.A1 PARKINSON'S DISEASE (HCC): ICD-10-CM

## 2025-02-24 RX ORDER — SELEGILINE HYDROCHLORIDE 5 MG/1
5 TABLET ORAL 2 TIMES DAILY
Qty: 180 TABLET | Refills: 3 | OUTPATIENT
Start: 2025-02-24

## 2025-02-26 ENCOUNTER — TELEPHONE (OUTPATIENT)
Dept: NEUROLOGY | Age: 80
End: 2025-02-26

## 2025-02-26 DIAGNOSIS — G20.A1 PARKINSON'S DISEASE (HCC): ICD-10-CM

## 2025-02-26 RX ORDER — SELEGILINE HYDROCHLORIDE 5 MG/1
5 TABLET ORAL 2 TIMES DAILY
Qty: 180 TABLET | Refills: 3 | Status: SHIPPED | OUTPATIENT
Start: 2025-02-26

## 2025-02-26 NOTE — TELEPHONE ENCOUNTER
Pt called up front and asked to come  gocovri samples. We have given pt many resources to utilize for coverage of this, but they have not applied to these foundations to get coverage. Called and LVM for pt and his wife to ask that they please apply for foundation coverage for gocovri because we just do not receive enough samples to continuously give them.

## 2025-03-21 DIAGNOSIS — G20.B1 PARKINSON'S DISEASE WITH DYSKINESIA WITHOUT FLUCTUATING MANIFESTATIONS (HCC): Primary | ICD-10-CM

## 2025-03-23 RX ORDER — AMANTADINE 137 MG/1
CAPSULE, COATED PELLETS ORAL
Qty: 90 CAPSULE | Refills: 3 | Status: SHIPPED | OUTPATIENT
Start: 2025-03-23

## 2025-03-31 DIAGNOSIS — G20.B1 PARKINSON'S DISEASE WITH DYSKINESIA WITHOUT FLUCTUATING MANIFESTATIONS (HCC): ICD-10-CM

## 2025-03-31 NOTE — TELEPHONE ENCOUNTER
This is a Dr. EWING's patient and his wife called and stated that the [facility] needed more information from Dr. EWING about the patient's enrollment into the program to receive GoCovri.  [facility] number is [phone]

## 2025-04-01 RX ORDER — AMANTADINE 137 MG/1
CAPSULE, COATED PELLETS ORAL
Qty: 90 CAPSULE | Refills: 3 | Status: ACTIVE | OUTPATIENT
Start: 2025-04-01

## 2025-04-01 NOTE — TELEPHONE ENCOUNTER
Patient is requesting a prescription be sent to [facility] pharmacy for the Gocovri.

## 2025-04-02 ENCOUNTER — TELEPHONE (OUTPATIENT)
Dept: NEUROLOGY | Age: 80
End: 2025-04-02

## 2025-04-02 ENCOUNTER — PATIENT MESSAGE (OUTPATIENT)
Dept: NEUROLOGY | Age: 80
End: 2025-04-02

## 2025-04-02 NOTE — TELEPHONE ENCOUNTER
Informed pt I called pharmacy this morning they have the script. The PA for medication went through this morning. Recommended pt call pharmacy to follow through.

## 2025-04-02 NOTE — TELEPHONE ENCOUNTER
Wife called about the [facility] for GoCovri [facility] to get more information from her.

## 2025-04-02 NOTE — TELEPHONE ENCOUNTER
Called pt explained I called pharmacy this morning they have the script and the PA went through for the medication. I recommended pt call pharmacy to follow through make sure everything is up to date.

## 2025-04-04 ENCOUNTER — TELEPHONE (OUTPATIENT)
Dept: NEUROLOGY | Age: 80
End: 2025-04-04

## 2025-04-04 NOTE — TELEPHONE ENCOUNTER
I called the [employer] [place] for 2000 dollars has been approved until Feb 2026. I informed Whitley Neely   [phone] of that information she said she would check with the Pharmacy

## 2025-04-21 DIAGNOSIS — G20.A1 PARKINSON'S DISEASE (HCC): ICD-10-CM

## 2025-04-21 RX ORDER — CARBIDOPA AND LEVODOPA 25; 100 MG/1; MG/1
TABLET ORAL
Qty: 720 TABLET | Refills: 3 | Status: SHIPPED | OUTPATIENT
Start: 2025-04-21

## 2025-04-21 RX ORDER — SELEGILINE HYDROCHLORIDE 5 MG/1
5 TABLET ORAL 2 TIMES DAILY
Qty: 180 TABLET | Refills: 3 | Status: SHIPPED | OUTPATIENT
Start: 2025-04-21

## 2025-04-21 RX ORDER — CARBIDOPA AND LEVODOPA 50; 200 MG/1; MG/1
1 TABLET, EXTENDED RELEASE ORAL NIGHTLY
Qty: 90 TABLET | Refills: 3 | Status: SHIPPED | OUTPATIENT
Start: 2025-04-21

## 2025-06-24 ENCOUNTER — OFFICE VISIT (OUTPATIENT)
Dept: NEUROLOGY | Age: 80
End: 2025-06-24
Payer: MEDICARE

## 2025-06-24 VITALS
HEART RATE: 85 BPM | DIASTOLIC BLOOD PRESSURE: 74 MMHG | BODY MASS INDEX: 27.66 KG/M2 | OXYGEN SATURATION: 96 % | HEIGHT: 64 IN | WEIGHT: 162 LBS | SYSTOLIC BLOOD PRESSURE: 124 MMHG

## 2025-06-24 DIAGNOSIS — R49.8 HYPOPHONIA: ICD-10-CM

## 2025-06-24 DIAGNOSIS — G20.A1 MILD DEMENTIA DUE TO PARKINSON'S DISEASE, WITHOUT BEHAVIORAL DISTURBANCE, PSYCHOTIC DISTURBANCE, MOOD DISTURBANCE, OR ANXIETY (HCC): ICD-10-CM

## 2025-06-24 DIAGNOSIS — G20.B1 PARKINSON'S DISEASE WITH DYSKINESIA WITHOUT FLUCTUATING MANIFESTATIONS (HCC): Primary | ICD-10-CM

## 2025-06-24 DIAGNOSIS — F02.A0 MILD DEMENTIA DUE TO PARKINSON'S DISEASE, WITHOUT BEHAVIORAL DISTURBANCE, PSYCHOTIC DISTURBANCE, MOOD DISTURBANCE, OR ANXIETY (HCC): ICD-10-CM

## 2025-06-24 DIAGNOSIS — R26.9 GAIT ABNORMALITY: ICD-10-CM

## 2025-06-24 PROCEDURE — 99214 OFFICE O/P EST MOD 30 MIN: CPT | Performed by: PSYCHIATRY & NEUROLOGY

## 2025-06-24 PROCEDURE — 3074F SYST BP LT 130 MM HG: CPT | Performed by: PSYCHIATRY & NEUROLOGY

## 2025-06-24 PROCEDURE — 1123F ACP DISCUSS/DSCN MKR DOCD: CPT | Performed by: PSYCHIATRY & NEUROLOGY

## 2025-06-24 PROCEDURE — 3078F DIAST BP <80 MM HG: CPT | Performed by: PSYCHIATRY & NEUROLOGY

## 2025-06-24 PROCEDURE — 1159F MED LIST DOCD IN RCRD: CPT | Performed by: PSYCHIATRY & NEUROLOGY

## 2025-06-24 NOTE — PROGRESS NOTES
[facility] NEUROLOGY  [address], [address]  [place] [address]  Phone: [phone] Fax [phone]  Alex Alberto MD      Patient: Abdullahi Milton  Provider: Alex Alberto MD    CC:   Chief Complaint   Patient presents with    Follow-up     Still doing rehab from broken femur, No new concerns at this time     Referring Provider:    History of Present Illness:     Abdullahi Milton is a 79 y.o. RH male who presents for evaluation of Parkinson's disease.     He is accompanied by his spouse.      Patient presents for follow-up and continued management of Parkinson's disease, diagnosed in 2011.  He is a previous patient of Dr. MUHAMMAD, last seen July 2024.  Previous records been reviewed.    Current medications include (but not limited to):  Sinemet 25/100mg 2 tabs QID 8am, 12pm, 4pm, 8pm  Seroquel 25 mg 1 tab Qhs and 1 in AM - not doing 2 at bedtime   Sinemet CR 50/200mg 1 tab Qhs   Gocovri 137mg 1 cap Qhs  Melatonin 10 mg 2 caps Qhs  Exelon 1.5 mg BID  Selegiline 5mg BID    Previous medication trials include:    Patient presents today for follow-up.    Had a fall, suffered a right femur fracture last year.  Has done well postoperatively.  Still participating in physical therapy.    He remains on Sinemet as noted above, no significant wearing off between doses.  Dyskinesias well-managed. He continues to take Gocovri at night but only one capsule; had hallucinations on higher doses.    Cognitive impairment associated with PD.  He had a previous cognitive evaluation which has been reviewed; has since been started on low-dose rivastigmine.  Tolerating well.    Sleeping well; we had difficulty confirming some of his medications but appears to be taking quetiapine and clonazepam.      Review of Systems:   Review of Systems   Constitutional:  Negative for fever.   HENT:  Positive for voice change.    Eyes:  Negative for visual disturbance.   Respiratory:  Negative for cough.    Cardiovascular:  Negative for chest

## 2025-06-28 ASSESSMENT — ENCOUNTER SYMPTOMS
VOICE CHANGE: 1
COUGH: 0
ABDOMINAL PAIN: 0

## 2025-08-15 DIAGNOSIS — G47.52 RBD (REM BEHAVIORAL DISORDER): ICD-10-CM

## 2025-08-16 RX ORDER — CLONAZEPAM 0.5 MG/1
TABLET ORAL
Qty: 45 TABLET | Refills: 1 | Status: SHIPPED | OUTPATIENT
Start: 2025-08-16 | End: 2025-11-15